# Patient Record
Sex: MALE | Race: WHITE
[De-identification: names, ages, dates, MRNs, and addresses within clinical notes are randomized per-mention and may not be internally consistent; named-entity substitution may affect disease eponyms.]

---

## 2020-07-26 ENCOUNTER — HOSPITAL ENCOUNTER (EMERGENCY)
Dept: HOSPITAL 11 - JP.ED | Age: 35
LOS: 1 days | Discharge: HOME | End: 2020-07-27
Payer: MEDICAID

## 2020-07-26 DIAGNOSIS — S70.01XA: ICD-10-CM

## 2020-07-26 DIAGNOSIS — F17.210: ICD-10-CM

## 2020-07-26 DIAGNOSIS — R56.9: ICD-10-CM
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
## 2020-07-26 DIAGNOSIS — I25.2: ICD-10-CM

## 2020-07-26 DIAGNOSIS — I25.10: ICD-10-CM

## 2020-07-26 DIAGNOSIS — S46.912A: Primary | ICD-10-CM

## 2020-07-26 PROCEDURE — 99283 EMERGENCY DEPT VISIT LOW MDM: CPT

## 2020-07-26 PROCEDURE — 73502 X-RAY EXAM HIP UNI 2-3 VIEWS: CPT

## 2020-07-26 PROCEDURE — 73030 X-RAY EXAM OF SHOULDER: CPT
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
## 2020-10-04 ENCOUNTER — HOSPITAL ENCOUNTER (EMERGENCY)
Dept: HOSPITAL 11 - JP.ED | Age: 35
Discharge: PSYCHIATRIC OR SUBSTANCE USE FACILITY | End: 2020-10-04
Payer: MEDICAID

## 2020-10-04 DIAGNOSIS — I25.2: ICD-10-CM
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
## 2020-10-04 DIAGNOSIS — Z79.899: ICD-10-CM

## 2020-10-04 DIAGNOSIS — F10.10: Primary | ICD-10-CM

## 2020-10-04 DIAGNOSIS — I25.10: ICD-10-CM
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
## 2021-02-17 ENCOUNTER — HOSPITAL ENCOUNTER (EMERGENCY)
Dept: HOSPITAL 11 - JP.ED | Age: 36
Discharge: OTHER INSTITUTION | End: 2021-02-17
Payer: MEDICAID

## 2021-02-17 DIAGNOSIS — Z88.5: ICD-10-CM
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
## 2021-02-17 DIAGNOSIS — Z91.018: ICD-10-CM

## 2021-02-17 DIAGNOSIS — Z79.899: ICD-10-CM

## 2021-02-17 DIAGNOSIS — Z72.0: ICD-10-CM

## 2021-02-17 DIAGNOSIS — I25.2: ICD-10-CM

## 2021-02-17 DIAGNOSIS — F10.129: Primary | ICD-10-CM

## 2021-02-17 NOTE — EDM.PDOCBH
ED HPI GENERAL MEDICAL PROBLEM





- General


Chief Complaint: Drug or Alcohol Abuse


Stated Complaint: EVAL


Time Seen by Provider: 02/17/21 17:30


Source of Information: Reports: Patient, Family


History Limitations: Reports: Intoxication





- History of Present Illness


INITIAL COMMENTS - FREE TEXT/NARRATIVE: 





35-year-old male who has chronic alcohol problems is here for placement into [facility].  He already has long-term treatment set up at [place] in the [facility] after his

detox, he is getting his second coronavirus vaccine at [place].  He has 

already called and they are waiting for him after physical screening.  He has no

physical symptoms.


Onset: Unknown/Unsure


Associated Symptoms: Reports: Other (Some nausea, no other physical symptoms)





- Related Data


                                    Allergies











Allergy/AdvReac Type Severity Reaction Status Date / Time


 


kiwi Allergy  Anaphylactic Verified 02/17/21 17:08





   Shock  


 


tramadol Allergy  Hives Verified 02/17/21 17:08


 


honeydo melon Allergy  Anaphylactic Uncoded 02/17/21 17:08





   Shock  











Home Meds: 


                                    Home Meds





Citalopram [Citalopram HBr] 20 mg PO DAILY 10/04/20 [History]


Testosterone Enanthate [Xyosted] 0.4 ml IM WEEKLY 10/04/20 [History]


hydrOXYzine HCL [Hydroxyzine HCl] 25 mg PO DAILY 10/04/20 [History]


Ascorbate Calcium [Vitamin C] 500 mg PO DAILY 02/17/21 [History]


Cholecalciferol (Vitamin D3) [Vitamin D3] 1,000 unit PO DAILY 02/17/21 [History]











Past Medical History


HEENT History: Reports: Impaired Vision, Other (See Below)


Other HEENT History: glasses and contacts


Cardiovascular History: Reports: MI


Respiratory History: Reports: Other (See Below)


Other Respiratory History: latenent TB XRays negative


Musculoskeletal History: Reports: Fracture


Neurological History: Reports: Seizure, Other (See Below)


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


Psychiatric History: Reports: Addiction, Depression, PTSD





- Infectious Disease History


Infectious Disease History: Reports: None





- Past Surgical History


Male  Surgical History: Reports: Penile Surgery, Other (See Below)


Other Male  Surgeries/Procedures: bilateral mastectomy.  nipple replacement





Social & Family History





- Tobacco Use


Tobacco Use Status *Q: Heavy Tobacco User


Years of Tobacco use: 19


Packs/Tins Daily: 1





- Caffeine Use


Caffeine Use: Reports: Energy Drinks, Soda





- Alcohol Use


Days Per Week of Alcohol Use: 5


Number of Drinks Per Day: 10


Total Drinks Per Week: 50





- Recreational Drug Use


Recreational Drug Use: No





ED ROS GENERAL





- Review of Systems


Review Of Systems: See Below


Constitutional: Denies: Fever, Chills


Respiratory: Denies: Shortness of Breath


Cardiovascular: Denies: Chest Pain


GI/Abdominal: Reports: Nausea.  Denies: Vomiting


Neurological: Denies: Headache





ED EXAM, BEHAVIORAL HEALTH





- Physical Exam


Exam: See Below


Exam Limited By: No Limitations


General Appearance: Alert, No Apparent Distress, Other (Obviously intoxicated)


Respiratory/Chest: No Respiratory Distress, Lungs Clear


Cardiovascular: Regular Rate, Rhythm


Neurological: Alert


Psychiatric: Depressed Mood


Skin Exam: Warm, Dry





COURSE, BEHAVIORAL HEALTH COMP





- Course


Vital Signs: 


                                Last Vital Signs











Temp  96.4 F L  02/17/21 17:04


 


Pulse  93   02/17/21 17:04


 


Resp  18   02/17/21 17:04


 


BP  123/80   02/17/21 17:04


 


Pulse Ox  94 L  02/17/21 17:04











Orders, Labs, Meds: 


                                Laboratory Tests











  02/17/21 02/17/21 Range/Units





  17:18 17:25 


 


Urine Opiates Screen  Negative   (NEGATIVE)  


 


Ur Oxycodone Screen  Negative   (NEGATIVE)  


 


Urine Methadone Screen  Negative   (NEGATIVE)  


 


Ur Propoxyphene Screen  Negative   (NEGATIVE)  


 


Ur Barbiturates Screen  Negative   (NEGATIVE)  


 


Ur Tricyclics Screen  Negative   (NEGATIVE)  


 


Ur Phencyclidine Scrn  Negative   (NEGATIVE)  


 


Ur Amphetamine Screen  Negative   (NEGATIVE)  


 


U Methamphetamines Scrn  Negative   (NEGATIVE)  


 


Urine MDMA Screen  Negative   (NEGATIVE)  


 


U Benzodiazepines Scrn  Negative   (NEGATIVE)  


 


U Cocaine Metab Screen  Negative   (NEGATIVE)  


 


U Marijuana (THC) Screen  Presumptive positive H   (NEGATIVE)  


 


Ethyl Alcohol   216  mg/dL











Re-Assessment/Re-Exam: 





Urine drug screen shows marijuana only, EtOH is 0.216.  Patient's significant 

other will be transferring him to [place] for admission.





Departure





- Departure


Time of Disposition: 18:08


Disposition: DC/Tfer to Other [facility]


Clinical Impression: 


 ETOH abuse








- Discharge Information


Instructions:  Alcohol Use Disorder


Referrals: 


Jeni Obrien NP [Primary Care Provider] - 


Forms:  ED Department Discharge


Care Plan Goals: 


Go directly to [place] for admission and follow their recommendations and 

treatment for detox and ultimately obtain treatment at [facility] as 

planned





Sepsis Event Note (ED)





- Evaluation


Sepsis Screening Result: No Definite Risk

## 2025-06-06 ENCOUNTER — HOSPITAL ENCOUNTER (EMERGENCY)
Dept: HOSPITAL 11 - JP.ED | Age: 40
LOS: 1 days | Discharge: OTHER INSTITUTION | End: 2025-06-07
Payer: SELF-PAY

## 2025-06-06 DIAGNOSIS — Z91.018: ICD-10-CM

## 2025-06-06 DIAGNOSIS — Z88.5: ICD-10-CM

## 2025-06-06 DIAGNOSIS — F10.139: Primary | ICD-10-CM

## 2025-06-06 DIAGNOSIS — Z91.030: ICD-10-CM

## 2025-06-06 DIAGNOSIS — I25.2: ICD-10-CM

## 2025-06-06 DIAGNOSIS — Z79.899: ICD-10-CM

## 2025-06-06 DIAGNOSIS — F17.210: ICD-10-CM
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
